# Patient Record
Sex: MALE | Race: WHITE
[De-identification: names, ages, dates, MRNs, and addresses within clinical notes are randomized per-mention and may not be internally consistent; named-entity substitution may affect disease eponyms.]

---

## 2020-01-14 ENCOUNTER — HOSPITAL ENCOUNTER (INPATIENT)
Dept: HOSPITAL 4 - SED | Age: 80
LOS: 2 days | Discharge: HOME | DRG: 392 | End: 2020-01-16
Attending: INTERNAL MEDICINE | Admitting: INTERNAL MEDICINE
Payer: COMMERCIAL

## 2020-01-14 VITALS — HEIGHT: 67 IN | BODY MASS INDEX: 29.03 KG/M2 | SYSTOLIC BLOOD PRESSURE: 174 MMHG | WEIGHT: 185 LBS

## 2020-01-14 VITALS — SYSTOLIC BLOOD PRESSURE: 171 MMHG

## 2020-01-14 DIAGNOSIS — Z79.82: ICD-10-CM

## 2020-01-14 DIAGNOSIS — E78.5: ICD-10-CM

## 2020-01-14 DIAGNOSIS — E11.9: ICD-10-CM

## 2020-01-14 DIAGNOSIS — I10: ICD-10-CM

## 2020-01-14 DIAGNOSIS — E78.00: ICD-10-CM

## 2020-01-14 DIAGNOSIS — K21.9: Primary | ICD-10-CM

## 2020-01-14 DIAGNOSIS — Z79.84: ICD-10-CM

## 2020-01-14 LAB
ALBUMIN SERPL BCP-MCNC: 4 G/DL (ref 3.4–4.8)
ALT SERPL W P-5'-P-CCNC: 37 U/L (ref 12–78)
ANION GAP SERPL CALCULATED.3IONS-SCNC: 8 MMOL/L (ref 5–15)
AST SERPL W P-5'-P-CCNC: 24 U/L (ref 10–37)
BASOPHILS # BLD AUTO: 0.1 K/UL (ref 0–0.2)
BASOPHILS NFR BLD AUTO: 0.7 % (ref 0–2)
BILIRUB SERPL-MCNC: 0.5 MG/DL (ref 0–1)
BUN SERPL-MCNC: 12 MG/DL (ref 8–21)
CALCIUM SERPL-MCNC: 8.5 MG/DL (ref 8.4–11)
CHLORIDE SERPL-SCNC: 101 MMOL/L (ref 98–107)
CREAT SERPL-MCNC: 0.82 MG/DL (ref 0.55–1.3)
EOSINOPHIL # BLD AUTO: 0.3 K/UL (ref 0–0.4)
EOSINOPHIL NFR BLD AUTO: 3.8 % (ref 0–4)
ERYTHROCYTE [DISTWIDTH] IN BLOOD BY AUTOMATED COUNT: 12.9 % (ref 9–15)
GFR SERPL CREATININE-BSD FRML MDRD: (no result) ML/MIN (ref 90–?)
GLUCOSE SERPL-MCNC: 120 MG/DL (ref 70–99)
HCT VFR BLD AUTO: 40.5 % (ref 36–54)
HGB BLD-MCNC: 13.9 G/DL (ref 14–18)
LYMPHOCYTES # BLD AUTO: 3.3 K/UL (ref 1–5.5)
LYMPHOCYTES NFR BLD AUTO: 37.1 % (ref 20.5–51.5)
MCH RBC QN AUTO: 34 PG (ref 27–31)
MCHC RBC AUTO-ENTMCNC: 34 % (ref 32–36)
MCV RBC AUTO: 99 FL (ref 79–98)
MONOCYTES # BLD MANUAL: 0.6 K/UL (ref 0–1)
MONOCYTES # BLD MANUAL: 6.4 % (ref 1.7–9.3)
NEUTROPHILS # BLD AUTO: 4.6 K/UL (ref 1.8–7.7)
NEUTROPHILS NFR BLD AUTO: 52 % (ref 40–70)
PLATELET # BLD AUTO: 183 K/UL (ref 130–430)
POTASSIUM SERPL-SCNC: 3.6 MMOL/L (ref 3.5–5.1)
RBC # BLD AUTO: 4.09 MIL/UL (ref 4.2–6.2)
SODIUM SERPLBLD-SCNC: 135 MMOL/L (ref 136–145)
WBC # BLD AUTO: 8.9 K/UL (ref 4.8–10.8)

## 2020-01-14 PROCEDURE — G0378 HOSPITAL OBSERVATION PER HR: HCPCS

## 2020-01-14 RX ADMIN — Medication SCH EA: at 23:45

## 2020-01-14 NOTE — NUR
BROUGHT IN BY [facility]. PLACED IN BED #4 AND TRIAGED. REPORT GIVEN TO 
CLAY

## 2020-01-14 NOTE — NUR
Administered ASA 162mg and Nitrostat PO as ordered by Dr. Baker.  Patient 
tolerated the medications well.

## 2020-01-14 NOTE — NUR
Pt brought in by [facility]. Pt awake, alert, oriented x4. Pt states that he 
was loading laundry into the dryer when he had sudden onset of chest/heart 
palpitations. Pt states that the palpitations traveled from right to left, then 
back again across chest. Pt states that he has had one similar episode and was 
brought to hospital on that occasion with no diagnosis. pt states now that 
symptoms are resolved. Pt denies chest pain, nausea, vomiting, diarrhea, 
shortness of breath at this time. Pt resting in ed bed comfortably. VSS

## 2020-01-14 NOTE — NUR
Patient will be admitted to care of .  Admitted to Tele unit.  Will 
go to room 135.  Belongings list completed.  Complete and up to date summary 
report printed. SBAR report to be given at bedside with opportunity for 
questions.

## 2020-01-14 NOTE — NUR
ADMISSION:

The patient, TANVI CROWE, 81 y/o, M admitted by ARAM MELGAR MD, with diagnosis of 
chest pain; primary nurse at bedside; was given written information regarding hospital 
policies, unit procedures and contact persons.

## 2020-01-14 NOTE — NUR
Opening notes

Patient is alert and oriented x4. Ambulatory with steady gait. No signs of distress noted. 
Breathing even and unlabored. Patient denies any chest pain at this time. IV patent and 
intact, no signs of infiltration noted. Updated patient on plan of care. Patient verbalized 
understanding. Call light with the patient. Safety precautions in place.

## 2020-01-14 NOTE — NUR
Transfer to Tele via ACLS protocol. Licensed nurse present. IV present no signs 
or symptoms of infiltration.

## 2020-01-15 VITALS — SYSTOLIC BLOOD PRESSURE: 135 MMHG

## 2020-01-15 VITALS — SYSTOLIC BLOOD PRESSURE: 150 MMHG

## 2020-01-15 VITALS — SYSTOLIC BLOOD PRESSURE: 144 MMHG

## 2020-01-15 VITALS — SYSTOLIC BLOOD PRESSURE: 130 MMHG

## 2020-01-15 VITALS — SYSTOLIC BLOOD PRESSURE: 147 MMHG

## 2020-01-15 RX ADMIN — Medication SCH EA: at 06:05

## 2020-01-15 RX ADMIN — Medication SCH EA: at 13:29

## 2020-01-15 RX ADMIN — ASPIRIN SCH MG: 325 TABLET, FILM COATED ORAL at 09:00

## 2020-01-15 RX ADMIN — Medication SCH EA: at 18:00

## 2020-01-15 NOTE — NUR
pt.assessed.pt.presets quiescent affect;calm,somnolent.general status stable.respiratory 
status stable.pt.capable to reposition 

self.call light/telephone w/in reach of the pt.

## 2020-01-15 NOTE — NUR
Sleeping

No signs of distress noted. Breathing even and unlabored. Call light with the patient. 
Safety precautions in place.

## 2020-01-15 NOTE — NUR
2100pmedications administred.no c/o pian,nausea.no requests posited@this hour.i have 
provided the pt.w/ wyatt pants,gown,socks.

pt.i have assisted the pt.w/the [facility] clothing.

## 2020-01-15 NOTE — NUR
SS NOTE:



MSW was referred by CM to see patient for DCP. Demographic information confirmed (pt's 
phone# [phone]).



MSW met with patient, pt's wife and son at bedside; pt ok'd for family to be there. Pt is 
alert and oriented x4. Pt is cooperative, appropriate affect with normal mood, normal speech 
and good eye contact. Pt is a 79 y/o  man who came in via ED after calling 911 for 
chest pain. Pt stated he was doing his normal chores yesterday and felt chest pain that 
radiates to the middle, prompting his wife to call 911. Pt stated he experienced something 
similar 5 years ago but with steady pain and was brought to [facility]; but unsure if admitted. Pt 
stated he lives with his wife Nevaeh and he is independent with his ADL's with no DME at 
home. Pt states he has an advanced directive and his wife Nevaeh is the primary POA, 
followed by 3 other sons. Pt states there are times when he gets depressed/sad/frustrated 
triggered by being forgetful but uses deep breathing to cope. Pt denies any substance 
use/abuse. Pt states he has seen a therapist as a couple in the past and if needed to talk 
to someone, he has an existing therapist that his wife sees. Pt stated that Dr. Angel met 
with him and planned for patient to get a stress test done at his office via outpatient, pt 
stated he is leaning towards that plan. For now, the patient is expected to be discharged 
home today, awaiting for Dr. Moreno to clear. No further SS needs identified at this time, 
but will remain available when needed.

## 2020-01-15 NOTE — NUR
Closing notes

Patient is resting in bed. No signs of distress noted. Breathing even and unlabored. Denies 
chest pain. IV patent and intact, no signs of infiltration noted. All needs met throughout 
the shift. Call light with the patient. Safety precautions in place. Will endorse care to 
day shift RN.

## 2020-01-15 NOTE — NUR
Accucheck 186

Patient refusing insulin coverage stating he just ate and he does not take insulin at home. 
Educated patient that doctor's has orders for an insulin sliding scale depending on 
patient's blood sugar.

## 2020-01-15 NOTE — NUR
pt.assessed v/s assessed.values w/in normal limits.no c/o pain,nausea.i have apprised the 
pt.that i may provide snacks/beverages 

w/in the shift.no requests posited @this hour.pt.capable to ambulate w/out assistance;gait 
assessed;steady.pt to submit to cardio-stress test;01/16/20.diet status to convert to npo:i 
have provided the info re;npo to the pt.call light/telephone w/in reach of the pt.

## 2020-01-15 NOTE — NUR
CONSULT:

CONSULT CALLED FOR DR. PICKARD

I SPOKE WITH [employer] EXCHANGE

REASON FOR CONSULT: CHEST PAIN

REQUESTING CONSULT: DR. MELGAR

CONSULTANT PHONE NUMBER: [phone]

## 2020-01-15 NOTE — NUR
Watching TV

No signs of distress noted. Breathing even and unlabored. Provided patient with blanket and 
water. Call light with the patient. Safety precautions in place.

## 2020-01-16 VITALS — SYSTOLIC BLOOD PRESSURE: 117 MMHG

## 2020-01-16 VITALS — SYSTOLIC BLOOD PRESSURE: 139 MMHG

## 2020-01-16 LAB
CHOLEST SERPL-MCNC: 147 MG/DL (ref ?–200)
HDLC SERPL-MCNC: 36 MG/DL (ref 45–?)
LDL CHOLESTEROL: 86 MG/DL (ref ?–100)
TRIGL SERPL-MCNC: 179 MG/DL (ref 30–150)
TSH SERPL DL<=0.05 MIU/L-ACNC: 2.77 UIU/ML (ref 0.36–3.74)

## 2020-01-16 RX ADMIN — Medication SCH EA: at 00:02

## 2020-01-16 RX ADMIN — ASPIRIN SCH MG: 325 TABLET, FILM COATED ORAL at 10:27

## 2020-01-16 RX ADMIN — Medication SCH EA: at 05:48

## 2020-01-16 NOTE — NUR
pt.assessed.v/s assessed;values w/in normal limits.no c/o pain,nausea.diet status has 
converted to npo;2/t cardio-stress test 

in the am;01/16/20.i have assessed the blood glucose:value;90mg/dl.iv access 
intatcxt;patnt.genral statu stebl.rwsopiratrport statu stable;unlabored.call light/telephone 
w/in reach of the pt.

## 2020-01-16 NOTE — NUR
pt.assessed.pt.presents quiescent affect;calm,somnolent.pt.capable to reposition 
self.general status stable.respiratory status stable;unlabored.call light/telephone w/in 
reach of the pt.

## 2020-01-16 NOTE — NUR
D/C Patient

Patient given medication reconciliation form and D/C instructions. Exit Care provided. 
Patient verbalized understanding. MD discussed with patient the results and treatment 
provided. Ambulatory with steady gait for discharge to home. Patient in stable condition, ID 
band removed. IV catheter removed, intact and dressing applied, no active bleeding. NO Rx  
given. Patient educated on pain management. All belongings sent with patient.

## 2020-01-16 NOTE — NUR
pt.assessed.pt.presents quiescent affect;calm,somnolent.pt. capable to reposition 
self.general status stable.respiratory 

status stable.call light/telephone w/in reach of the pt.

## 2020-01-16 NOTE — NUR
pt.assessed.pt.presents quiescent affect;calm,resting i have assessed the blood 
glucose;values;112mg/dl.n c/o pin,nausea.diet status npo.

submit to cardio-stress test this am.general status stable.respiratory status 
stable;unlabored.call light/telephone w/in the reach 

of the pt.

## 2023-10-14 ENCOUNTER — HOSPITAL ENCOUNTER (EMERGENCY)
Dept: HOSPITAL 4 - SED | Age: 83
Discharge: HOME | End: 2023-10-14
Payer: MEDICARE

## 2023-10-14 VITALS — RESPIRATION RATE: 15 BRPM | SYSTOLIC BLOOD PRESSURE: 146 MMHG | OXYGEN SATURATION: 97 % | HEART RATE: 65 BPM

## 2023-10-14 VITALS
RESPIRATION RATE: 16 BRPM | OXYGEN SATURATION: 96 % | TEMPERATURE: 97.3 F | SYSTOLIC BLOOD PRESSURE: 156 MMHG | HEART RATE: 55 BPM

## 2023-10-14 VITALS — WEIGHT: 150 LBS | BODY MASS INDEX: 23.54 KG/M2 | HEIGHT: 67 IN

## 2023-10-14 DIAGNOSIS — E78.00: ICD-10-CM

## 2023-10-14 DIAGNOSIS — R07.9: Primary | ICD-10-CM

## 2023-10-14 DIAGNOSIS — Z79.899: ICD-10-CM

## 2023-10-14 LAB
ALBUMIN SERPL BCP-MCNC: 3.4 G/DL (ref 3.4–4.8)
ALT SERPL W P-5'-P-CCNC: 24 U/L (ref 12–78)
ANION GAP SERPL CALCULATED.3IONS-SCNC: 8 MMOL/L (ref 5–15)
AST SERPL W P-5'-P-CCNC: 21 U/L (ref 10–37)
BASOPHILS # BLD AUTO: 0 K/UL (ref 0–0.2)
BASOPHILS NFR BLD AUTO: 0.6 % (ref 0–2)
BILIRUB SERPL-MCNC: 1.1 MG/DL (ref 0–1)
BUN SERPL-MCNC: 12 MG/DL (ref 8–21)
CALCIUM SERPL-MCNC: 8.5 MG/DL (ref 8.4–11)
CHLORIDE SERPL-SCNC: 102 MMOL/L (ref 98–107)
CO2 SERPLBLD-SCNC: 24 MMOL/L (ref 23–29)
CREAT SERPL-MCNC: 0.86 MG/DL (ref 0.55–1.3)
EOSINOPHIL # BLD AUTO: 0.2 K/UL (ref 0–0.4)
EOSINOPHIL NFR BLD AUTO: 2.7 % (ref 0–4)
ERYTHROCYTE [DISTWIDTH] IN BLOOD BY AUTOMATED COUNT: 13.5 % (ref 9–15)
GFR SERPL CREATININE-BSD FRML MDRD: (no result) ML/MIN (ref 90–?)
GLUCOSE SERPL-MCNC: 118 MG/DL (ref 74–106)
HCT VFR BLD AUTO: 40.5 % (ref 36–54)
HGB BLD-MCNC: 13.1 G/DL (ref 14–18)
LYMPHOCYTES # BLD AUTO: 2.1 K/UL (ref 1–5.5)
LYMPHOCYTES NFR BLD AUTO: 29.3 % (ref 20.5–51.5)
MCH RBC QN AUTO: 32 PG (ref 27–31)
MCHC RBC AUTO-ENTMCNC: 32 % (ref 32–36)
MCV RBC AUTO: 99 FL (ref 79–98)
MONOCYTES # BLD MANUAL: 0.6 K/UL (ref 0–1)
MONOCYTES # BLD MANUAL: 8.2 % (ref 1.7–9.3)
NEUTROPHILS # BLD AUTO: 4.3 K/UL (ref 1.8–7.7)
NEUTROPHILS NFR BLD AUTO: 59.2 % (ref 40–70)
PLATELET # BLD AUTO: 141 K/UL (ref 130–430)
POTASSIUM SERPL-SCNC: 3.3 MMOL/L (ref 3.5–5.1)
PROT SERPL-MCNC: 6.8 G/DL (ref 6.4–8.3)
RBC # BLD AUTO: 4.08 MIL/UL (ref 4.2–6.2)
SODIUM SERPLBLD-SCNC: 134 MMOL/L (ref 136–145)
WBC # BLD AUTO: 7.3 K/UL (ref 4.8–10.8)